# Patient Record
Sex: MALE | Race: WHITE | NOT HISPANIC OR LATINO | Employment: FULL TIME | URBAN - METROPOLITAN AREA
[De-identification: names, ages, dates, MRNs, and addresses within clinical notes are randomized per-mention and may not be internally consistent; named-entity substitution may affect disease eponyms.]

---

## 2019-07-17 ENCOUNTER — HOSPITAL ENCOUNTER (OUTPATIENT)
Facility: MEDICAL CENTER | Age: 54
End: 2019-07-17
Attending: UROLOGY
Payer: OTHER MISCELLANEOUS

## 2019-07-17 LAB
FORWARD REASON: SPWHY: NORMAL
FORWARDED TO LAB: SPWHR: NORMAL
SPECIMEN SENT: SPWT1: NORMAL

## 2021-07-14 ENCOUNTER — APPOINTMENT (RX ONLY)
Dept: URBAN - METROPOLITAN AREA CLINIC 38 | Facility: CLINIC | Age: 56
Setting detail: DERMATOLOGY
End: 2021-07-14

## 2021-07-14 DIAGNOSIS — Z71.89 OTHER SPECIFIED COUNSELING: ICD-10-CM

## 2021-07-14 DIAGNOSIS — L81.4 OTHER MELANIN HYPERPIGMENTATION: ICD-10-CM

## 2021-07-14 DIAGNOSIS — Z85.828 PERSONAL HISTORY OF OTHER MALIGNANT NEOPLASM OF SKIN: ICD-10-CM

## 2021-07-14 DIAGNOSIS — D22 MELANOCYTIC NEVI: ICD-10-CM

## 2021-07-14 DIAGNOSIS — L82.1 OTHER SEBORRHEIC KERATOSIS: ICD-10-CM

## 2021-07-14 PROBLEM — D22.9 MELANOCYTIC NEVI, UNSPECIFIED: Status: ACTIVE | Noted: 2021-07-14

## 2021-07-14 PROCEDURE — ? COUNSELING

## 2021-07-14 PROCEDURE — 99203 OFFICE O/P NEW LOW 30 MIN: CPT

## 2021-07-14 ASSESSMENT — LOCATION DETAILED DESCRIPTION DERM
LOCATION DETAILED: RIGHT CENTRAL FRONTAL SCALP
LOCATION DETAILED: RIGHT INFERIOR UPPER BACK
LOCATION DETAILED: SUPRAPUBIC SKIN

## 2021-07-14 ASSESSMENT — LOCATION SIMPLE DESCRIPTION DERM
LOCATION SIMPLE: GROIN
LOCATION SIMPLE: RIGHT UPPER BACK
LOCATION SIMPLE: RIGHT SCALP

## 2021-07-14 ASSESSMENT — LOCATION ZONE DERM
LOCATION ZONE: TRUNK
LOCATION ZONE: SCALP

## 2022-03-14 ENCOUNTER — HOSPITAL ENCOUNTER (EMERGENCY)
Facility: MEDICAL CENTER | Age: 57
End: 2022-03-14
Attending: EMERGENCY MEDICINE
Payer: COMMERCIAL

## 2022-03-14 ENCOUNTER — APPOINTMENT (OUTPATIENT)
Dept: RADIOLOGY | Facility: MEDICAL CENTER | Age: 57
End: 2022-03-14
Attending: EMERGENCY MEDICINE
Payer: COMMERCIAL

## 2022-03-14 VITALS
SYSTOLIC BLOOD PRESSURE: 134 MMHG | HEART RATE: 55 BPM | OXYGEN SATURATION: 98 % | BODY MASS INDEX: 28.31 KG/M2 | RESPIRATION RATE: 19 BRPM | DIASTOLIC BLOOD PRESSURE: 88 MMHG | TEMPERATURE: 97.6 F | WEIGHT: 209 LBS | HEIGHT: 72 IN
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
## 2022-03-14 DIAGNOSIS — S29.8XXA BLUNT TRAUMA TO CHEST, INITIAL ENCOUNTER: ICD-10-CM

## 2022-03-14 DIAGNOSIS — S43.101A SEPARATION OF RIGHT ACROMIOCLAVICULAR JOINT, INITIAL ENCOUNTER: ICD-10-CM

## 2022-03-14 PROCEDURE — 71045 X-RAY EXAM CHEST 1 VIEW: CPT

## 2022-03-14 PROCEDURE — 99283 EMERGENCY DEPT VISIT LOW MDM: CPT

## 2022-03-14 PROCEDURE — 73030 X-RAY EXAM OF SHOULDER: CPT | Mod: RT

## 2022-03-14 NOTE — ED NOTES
Patient given discharge instructions questions and concerns addressed patient ambulated out Ice pack given

## 2022-03-14 NOTE — ED PROVIDER NOTES
"ED Provider Note    Scribed for Ector Mast M.D. by Juno Delgado. 3/14/2022, 11:28 AM.    Primary care provider: No primary care provider noted  Means of arrival: Walk in  History obtained from: Patient  History limited by: None    CHIEF COMPLAINT  Chief Complaint   Patient presents with    T-5000 GLF     Fell skiing 1 1/2 hrs ago C/O chest and facial pain No LOC  Positive helmet Denies any new neck pain   \" hard packed snow and I hit hard \"       HPI  Chucho Rodriguez is a 57 y.o. male who presents to the Emergency Department for evaluation following a fall while skiing onset about 2 hours ago. He states he was skiing, when he fell face first into hard, packed snow. He was helmeted and denies loss of consciousness. He admits to associated symptoms of mild to moderate chest wall pain, right shoulder pain and facial pain. He denies shortness of breath. No alleviating factors were reported.  He denies medicating with any blood thinners.     REVIEW OF SYSTEMS  Pertinent positives include chest wall pain, right shoulder pain and facial pain.   Pertinent negatives include no loss of consciousness, shortness of breath.       PAST MEDICAL HISTORY   has a past medical history of Cancer (HCC).    SURGICAL HISTORY   has a past surgical history that includes other abdominal surgery and other orthopedic surgery.    SOCIAL HISTORY  Social History     Tobacco Use    Smoking status: Former Smoker     Types: Cigarettes    Smokeless tobacco: Never Used   Vaping Use    Vaping Use: Never used   Substance Use Topics    Alcohol use: Yes     Comment: moderate    Drug use: Never      Social History     Substance and Sexual Activity   Drug Use Never     FAMILY HISTORY  History reviewed. No pertinent family history.    CURRENT MEDICATIONS  No current outpatient medications noted    ALLERGIES  Allergies   Allergen Reactions    Iodine Rash     rash    Shellfish Allergy        PHYSICAL EXAM  VITAL SIGNS: /83   Pulse (!) 56   Temp " 36.3 °C (97.3 °F) (Temporal)   Resp 19   Ht 1.829 m (6')   Wt 94.8 kg (208 lb 15.9 oz)   SpO2 99%   BMI 28.34 kg/m²     Nursing note and vitals reviewed.  Constitutional: No distress.   HENT: Head is atraumatic. Oropharynx is moist.   Eyes: Conjunctivae are normal. Pupils are equal, round, and reactive to light.   Cardiovascular: Normal peripheral perfusion  Respiratory: No respiratory distress.   Musculoskeletal: Mild tenderness to palpation over the sternum and right shoulder, with bony crepitus with movement of the shoulder. Normal range of motion.   Neurological: Alert. No focal deficits noted.    Skin: No rash.   Psych: Appropriate for clinical situation     DIAGNOSTIC STUDIES / PROCEDURES    RADIOLOGY  DX-CHEST-PORTABLE (1 VIEW)   Final Result      No evidence of acute cardiopulmonary process.      DX-SHOULDER 2+ RIGHT   Final Result      1.  No acute fractures identified.      2.  Slight widening of the acromioclavicular joint suggests AC joint separation.        The radiologist's interpretation of all radiological studies have been reviewed by me.    COURSE & MEDICAL DECISION MAKING  Nursing notes, VS, PMSFHx reviewed in chart.    11:28 AM - Patient seen and examined at bedside. I informed the patient of my plan to run diagnostic studies to evaluate their symptoms including imaging. Patient verbalizes understanding and support with my plan of care. Ordered DX-Chest, DX-Shoulder Right to evaluate his symptoms.     12:21 PM - I reevaluated the patient at bedside. I discussed the patient's diagnostic study results which show a separation of the right AC joint. I discussed plan for discharge and follow up as outlined below. The patient verbalizes they feel comfortable going home. The patient is stable for discharge at this time and will return for any new or worsening symptoms. Patient verbalizes understanding and support with my plan for discharge.      DISPOSITION:  Patient will be discharged home in  stable condition.    FOLLOW UP:  [facility], Emergency Dept  [address]  [place] [id number] [phone]    If symptoms worsen    Samuel Galvan M.D.  [address]  [address]  [place] [id number] [phone]    Schedule an appointment as soon as possible for a visit   on-call orthopedic surgeon    The patient agreed to the discharge precautions and follow-up plan which is documented in EPIC.    FINAL IMPRESSION  1. Fall, initial encounter    2. Separation of right acromioclavicular joint, initial encounter    3. Blunt trauma to chest, initial encounter        IJuno (Scribe), am scribing for, and in the presence of, Ector Mast M.D..    Electronically signed by: Juno Delgado (Scribe), 3/14/2022    IEctor M.D. personally performed the services described in this documentation, as scribed by Juno Delgado in my presence, and it is both accurate and complete.    The note accurately reflects work and decisions made by me.  Ector Mast M.D.  3/14/2022  3:49 PM